# Patient Record
Sex: MALE | Race: BLACK OR AFRICAN AMERICAN | NOT HISPANIC OR LATINO | URBAN - METROPOLITAN AREA
[De-identification: names, ages, dates, MRNs, and addresses within clinical notes are randomized per-mention and may not be internally consistent; named-entity substitution may affect disease eponyms.]

---

## 2017-11-28 VITALS
SYSTOLIC BLOOD PRESSURE: 166 MMHG | WEIGHT: 220.02 LBS | DIASTOLIC BLOOD PRESSURE: 101 MMHG | TEMPERATURE: 97 F | HEART RATE: 71 BPM | OXYGEN SATURATION: 100 % | HEIGHT: 69 IN

## 2017-11-28 RX ORDER — CHLORHEXIDINE GLUCONATE 213 G/1000ML
1 SOLUTION TOPICAL ONCE
Qty: 0 | Refills: 0 | Status: DISCONTINUED | OUTPATIENT
Start: 2017-11-29 | End: 2017-11-29

## 2017-11-28 NOTE — H&P ADULT - ASSESSMENT
43 y/o man, current every day smoker, with PMHx of DM2, HTN, and HLD, presenting today for cardiac cath with possible intervention in light of risk factors, CCS Class IV anginal symptoms, abnormal NST to r/o suspected underlying CAD.     ASA III, Mallampati III    Patient reports he took home dose metformin this AM. EF 55-60% and euvolemic on exam. IV NS @ 125 cc/hr for hydration.     Last home dose Ecotrin 81 mg last week per pt report. Will order loading dose Ecotrin 325 mg, Plavix 600 mg pre-cath.    /101 and patient denies taking home metoprolol since 11/24/17. Will order metoprolol succinate 25 mg once pre-cath and RN to recheck BP.     Risks & benefits of procedure and alternative therapy have been explained to the patient including but not limited to: allergic reaction, bleeding w/possible need for blood transfusion, infection, renal and vascular compromise, limb damage, arrhythmia, stroke, vessel dissection/perforation, Myocardial infarction, emergent CABG. Informed consent obtained and in chart

## 2017-11-28 NOTE — H&P ADULT - NSHPLABSRESULTS_GEN_ALL_CORE
11.8   6.1   )-----------( 196      ( 29 Nov 2017 08:58 )             36.3     11-29    139  |  102  |  14  ----------------------------<  292<H>  4.1   |  23  |  0.89    Ca    9.0      29 Nov 2017 08:58    TPro  7.8  /  Alb  4.2  /  TBili  <0.2  /  DBili  x   /  AST  17  /  ALT  17  /  AlkPhos  75  11-29    PT/INR - ( 29 Nov 2017 08:58 )   PT: 10.1 sec;   INR: 0.91          PTT - ( 29 Nov 2017 08:58 )  PTT:27.6 sec    CARDIAC MARKERS ( 29 Nov 2017 08:58 )  x     / x     / 188 U/L / x     / 1.9 ng/mL      EKG: NSR @ 71 BPM with LVH, early repolarization in V1-V4, TWI in I, II, AVF, AVL, V4-V6, prolonged QTc @ 471

## 2017-11-28 NOTE — H&P ADULT - FAMILY HISTORY
Mother  Still living? No  Family history of diabetes mellitus in mother, Age at diagnosis: Age Unknown  Family history of epilepsy in mother, Age at diagnosis: Age Unknown

## 2017-11-28 NOTE — H&P ADULT - HISTORY OF PRESENT ILLNESS
43 y/o man with PMHx of DM2, HTN, and HLD, presented to his cardiologist with c/o chest pain. Per pt, approx ****** he began experiencing exertional chest pain, squeezing in nature, and occurring after walking *********. Pt was subsequently referred for stress test and Echo. Pt thus underwent Exercise Stress Test on 11/1/17 which revealed positive exercise induced angina and 1 mm diffuse ST depression. Echo (done 11/1/17) was a technically difficult study showed moderate concentric LVH, LVEF 55-60%, and impaired relaxation.     In light of pt's risk factors, CCS Class **** anginal symptoms, and abnormal tests results, pt was referred for cardiac catheterization with possible intervention. ** PATIENT WILL BRING IN HIS MEDICATIONS ***********     41 y/o man, current every day smoker, with PMHx of DM2, HTN, and HLD, presented to his cardiologist with c/o chest pain. Per pt, approximately 2 months ago he began experiencing left sided chest pain, squeezing in nature, associated with SOB and sometimes radiating to his LUE. Per pt, chest pain occurs with rest or with exertion, not alleviated with SL NTG, can last for a short period of time to entire day, and has been progressively worsening (i.e., occurs more frequently). As a result, pt was subsequently referred for stress test and Echo. Pt thus underwent an Exercise Stress Test on 11/1/17 which revealed positive exercise induced angina and 1 mm diffuse ST depression. Echo (done 11/1/17) was a technically difficult study that showed moderate concentric LVH, LVEF 55-60%, and impaired relaxation.     In light of pt's risk factors, CCS Class IV anginal symptoms, and abnormal tests results, pt was referred for cardiac catheterization with possible intervention. 41 y/o man, current every day smoker, with PMHx of DM2, HTN, and HLD, presented to his cardiologist with c/o chest pain. Per pt, approximately 2 months ago he began experiencing left sided chest pain, squeezing in nature, associated with SOB and sometimes radiating to his LUE. Per pt, chest pain occurs with rest or with exertion, not alleviated with SL NTG, can last for a short period of time to entire day, and has been progressively worsening (i.e., occurs more frequently). Self D/C'ed Imdur as he reports it caused headache. As a result, pt was subsequently referred for stress test and Echo. Pt thus underwent an Exercise Stress Test on 11/1/17 which revealed positive exercise induced angina and 1 mm diffuse ST depression. Echo (done 11/1/17) was a technically difficult study that showed moderate concentric LVH, LVEF 55-60%, and impaired relaxation.     In light of pt's risk factors, CCS Class IV anginal symptoms, and abnormal tests results, pt was referred for cardiac catheterization with possible intervention.

## 2017-11-28 NOTE — H&P ADULT - NSHPSOCIALHISTORY_GEN_ALL_CORE
Current every day smoker --- previously smoked 1 ppd since he was 12 years old (30+ years), now trying to quit so has been cutting down. Currently smokes approx 5 cigarettes per day as per pt. Admits to daily marijuana use. Denies EtOH use or other drug use.

## 2017-11-29 ENCOUNTER — OUTPATIENT (OUTPATIENT)
Dept: OUTPATIENT SERVICES | Facility: HOSPITAL | Age: 42
LOS: 1 days | Discharge: NURSING, REHAB OR LONG-TERM CARE | End: 2017-11-29
Payer: COMMERCIAL

## 2017-11-29 LAB
ALBUMIN SERPL ELPH-MCNC: 4.2 G/DL — SIGNIFICANT CHANGE UP (ref 3.3–5)
ALP SERPL-CCNC: 75 U/L — SIGNIFICANT CHANGE UP (ref 40–120)
ALT FLD-CCNC: 17 U/L — SIGNIFICANT CHANGE UP (ref 10–45)
ANION GAP SERPL CALC-SCNC: 14 MMOL/L — SIGNIFICANT CHANGE UP (ref 5–17)
APTT BLD: 27.6 SEC — SIGNIFICANT CHANGE UP (ref 27.5–37.4)
AST SERPL-CCNC: 17 U/L — SIGNIFICANT CHANGE UP (ref 10–40)
BASOPHILS NFR BLD AUTO: 0.7 % — SIGNIFICANT CHANGE UP (ref 0–2)
BILIRUB SERPL-MCNC: <0.2 MG/DL — SIGNIFICANT CHANGE UP (ref 0.2–1.2)
BUN SERPL-MCNC: 14 MG/DL — SIGNIFICANT CHANGE UP (ref 7–23)
CALCIUM SERPL-MCNC: 9 MG/DL — SIGNIFICANT CHANGE UP (ref 8.4–10.5)
CHLORIDE SERPL-SCNC: 102 MMOL/L — SIGNIFICANT CHANGE UP (ref 96–108)
CHOLEST SERPL-MCNC: 161 MG/DL — SIGNIFICANT CHANGE UP (ref 10–199)
CK MB CFR SERPL CALC: 1.9 NG/ML — SIGNIFICANT CHANGE UP (ref 0–6.7)
CK SERPL-CCNC: 188 U/L — SIGNIFICANT CHANGE UP (ref 30–200)
CO2 SERPL-SCNC: 23 MMOL/L — SIGNIFICANT CHANGE UP (ref 22–31)
CREAT SERPL-MCNC: 0.89 MG/DL — SIGNIFICANT CHANGE UP (ref 0.5–1.3)
CRP SERPL-MCNC: 0.8 MG/DL — HIGH (ref 0–0.4)
EOSINOPHIL NFR BLD AUTO: 2 % — SIGNIFICANT CHANGE UP (ref 0–6)
GLUCOSE BLDC GLUCOMTR-MCNC: 254 MG/DL — HIGH (ref 70–99)
GLUCOSE SERPL-MCNC: 292 MG/DL — HIGH (ref 70–99)
HBA1C BLD-MCNC: 9.7 % — HIGH (ref 4–5.6)
HCT VFR BLD CALC: 36.3 % — LOW (ref 39–50)
HDLC SERPL-MCNC: 29 MG/DL — LOW (ref 40–125)
HGB BLD-MCNC: 11.8 G/DL — LOW (ref 13–17)
INR BLD: 0.91 — SIGNIFICANT CHANGE UP (ref 0.88–1.16)
LIPID PNL WITH DIRECT LDL SERPL: 64 MG/DL — SIGNIFICANT CHANGE UP
LYMPHOCYTES # BLD AUTO: 41.4 % — SIGNIFICANT CHANGE UP (ref 13–44)
MCHC RBC-ENTMCNC: 22.6 PG — LOW (ref 27–34)
MCHC RBC-ENTMCNC: 32.5 G/DL — SIGNIFICANT CHANGE UP (ref 32–36)
MCV RBC AUTO: 69.4 FL — LOW (ref 80–100)
MONOCYTES NFR BLD AUTO: 5.4 % — SIGNIFICANT CHANGE UP (ref 2–14)
NEUTROPHILS NFR BLD AUTO: 50.5 % — SIGNIFICANT CHANGE UP (ref 43–77)
PLATELET # BLD AUTO: 196 K/UL — SIGNIFICANT CHANGE UP (ref 150–400)
POTASSIUM SERPL-MCNC: 4.1 MMOL/L — SIGNIFICANT CHANGE UP (ref 3.5–5.3)
POTASSIUM SERPL-SCNC: 4.1 MMOL/L — SIGNIFICANT CHANGE UP (ref 3.5–5.3)
PROT SERPL-MCNC: 7.8 G/DL — SIGNIFICANT CHANGE UP (ref 6–8.3)
PROTHROM AB SERPL-ACNC: 10.1 SEC — SIGNIFICANT CHANGE UP (ref 9.8–12.7)
RBC # BLD: 5.23 M/UL — SIGNIFICANT CHANGE UP (ref 4.2–5.8)
RBC # FLD: 15.5 % — SIGNIFICANT CHANGE UP (ref 10.3–16.9)
SODIUM SERPL-SCNC: 139 MMOL/L — SIGNIFICANT CHANGE UP (ref 135–145)
TOTAL CHOLESTEROL/HDL RATIO MEASUREMENT: 5.6 RATIO — SIGNIFICANT CHANGE UP (ref 3.4–9.6)
TRIGL SERPL-MCNC: 339 MG/DL — HIGH (ref 10–149)
WBC # BLD: 6.1 K/UL — SIGNIFICANT CHANGE UP (ref 3.8–10.5)
WBC # FLD AUTO: 6.1 K/UL — SIGNIFICANT CHANGE UP (ref 3.8–10.5)

## 2017-11-29 PROCEDURE — 82553 CREATINE MB FRACTION: CPT

## 2017-11-29 PROCEDURE — 93005 ELECTROCARDIOGRAM TRACING: CPT

## 2017-11-29 PROCEDURE — 93454 CORONARY ARTERY ANGIO S&I: CPT

## 2017-11-29 PROCEDURE — 82962 GLUCOSE BLOOD TEST: CPT

## 2017-11-29 PROCEDURE — 93454 CORONARY ARTERY ANGIO S&I: CPT | Mod: 26

## 2017-11-29 PROCEDURE — 86140 C-REACTIVE PROTEIN: CPT

## 2017-11-29 PROCEDURE — 82550 ASSAY OF CK (CPK): CPT

## 2017-11-29 PROCEDURE — C1769: CPT

## 2017-11-29 PROCEDURE — 85730 THROMBOPLASTIN TIME PARTIAL: CPT

## 2017-11-29 PROCEDURE — 83036 HEMOGLOBIN GLYCOSYLATED A1C: CPT

## 2017-11-29 PROCEDURE — 80053 COMPREHEN METABOLIC PANEL: CPT

## 2017-11-29 PROCEDURE — 85610 PROTHROMBIN TIME: CPT

## 2017-11-29 PROCEDURE — 80061 LIPID PANEL: CPT

## 2017-11-29 PROCEDURE — C1887: CPT

## 2017-11-29 PROCEDURE — 85025 COMPLETE CBC W/AUTO DIFF WBC: CPT

## 2017-11-29 PROCEDURE — 93010 ELECTROCARDIOGRAM REPORT: CPT

## 2017-11-29 RX ORDER — ISOSORBIDE MONONITRATE 60 MG/1
1 TABLET, EXTENDED RELEASE ORAL
Qty: 0 | Refills: 0 | COMMUNITY

## 2017-11-29 RX ORDER — ATORVASTATIN CALCIUM 80 MG/1
1 TABLET, FILM COATED ORAL
Qty: 0 | Refills: 0 | COMMUNITY

## 2017-11-29 RX ORDER — METOPROLOL TARTRATE 50 MG
25 TABLET ORAL ONCE
Qty: 0 | Refills: 0 | Status: COMPLETED | OUTPATIENT
Start: 2017-11-29 | End: 2017-11-29

## 2017-11-29 RX ORDER — ASPIRIN/CALCIUM CARB/MAGNESIUM 324 MG
1 TABLET ORAL
Qty: 0 | Refills: 0 | COMMUNITY

## 2017-11-29 RX ORDER — CLOPIDOGREL BISULFATE 75 MG/1
600 TABLET, FILM COATED ORAL ONCE
Qty: 0 | Refills: 0 | Status: COMPLETED | OUTPATIENT
Start: 2017-11-29 | End: 2017-11-29

## 2017-11-29 RX ORDER — SODIUM CHLORIDE 9 MG/ML
500 INJECTION INTRAMUSCULAR; INTRAVENOUS; SUBCUTANEOUS
Qty: 0 | Refills: 0 | Status: DISCONTINUED | OUTPATIENT
Start: 2017-11-29 | End: 2017-11-29

## 2017-11-29 RX ORDER — INSULIN GLARGINE 100 [IU]/ML
30 INJECTION, SOLUTION SUBCUTANEOUS
Qty: 0 | Refills: 0 | COMMUNITY

## 2017-11-29 RX ORDER — METFORMIN HYDROCHLORIDE 850 MG/1
1 TABLET ORAL
Qty: 0 | Refills: 0 | COMMUNITY

## 2017-11-29 RX ORDER — METOPROLOL TARTRATE 50 MG
1 TABLET ORAL
Qty: 0 | Refills: 0 | COMMUNITY

## 2017-11-29 RX ORDER — ASPIRIN/CALCIUM CARB/MAGNESIUM 324 MG
325 TABLET ORAL ONCE
Qty: 0 | Refills: 0 | Status: COMPLETED | OUTPATIENT
Start: 2017-11-29 | End: 2017-11-29

## 2017-11-29 RX ORDER — INSULIN LISPRO 100/ML
VIAL (ML) SUBCUTANEOUS
Qty: 0 | Refills: 0 | Status: DISCONTINUED | OUTPATIENT
Start: 2017-11-29 | End: 2017-11-29

## 2017-11-29 RX ADMIN — Medication 325 MILLIGRAM(S): at 10:44

## 2017-11-29 RX ADMIN — Medication 6: at 10:54

## 2017-11-29 RX ADMIN — SODIUM CHLORIDE 125 MILLILITER(S): 9 INJECTION INTRAMUSCULAR; INTRAVENOUS; SUBCUTANEOUS at 11:08

## 2017-11-29 RX ADMIN — Medication 25 MILLIGRAM(S): at 11:08

## 2017-11-29 RX ADMIN — CLOPIDOGREL BISULFATE 600 MILLIGRAM(S): 75 TABLET, FILM COATED ORAL at 10:42

## 2017-11-29 NOTE — PROGRESS NOTE ADULT - SUBJECTIVE AND OBJECTIVE BOX
Interventional Cardiology PA SDA Discharge Note    Patient without complaints. Ambulated and voided without difficulties    Afebrile, VSS    Ext:    		Right             Radial : no   hematoma,   no  bleeding, dressing; C/D/I      Pulses:    intact RAD to baseline     A/P:  41 y/o man, current every day smoker, with PMHx of DM2, HTN, and HLD, presenting today for cardiac cath with possible intervention in light of risk factors, CCS Class IV anginal symptoms, abnormal NST to r/o suspected underlying CAD. s/p Cardiac Cath revealing normal coronaries, EF normal by echo. R radial access. Pt to c/w medical management with emphasis on smoking cessation.                 1.	Stable for discharge as per attending Dr. Walker after bed rest, pt voids, wrist stable and 30 minutes of ambulation.  2.	Follow-up with PMD/Cardiologist Dr. Carey in 1-2 weeks  3.	Discharged forms signed and copies in chart

## 2019-10-23 NOTE — H&P ADULT - [facility]
Letter printed and placed up front    Patient notified and verbalized understanding of the information provided regular rate and rhythm/no murmur/no rub